# Patient Record
Sex: MALE | Race: WHITE | NOT HISPANIC OR LATINO | Employment: FULL TIME | URBAN - METROPOLITAN AREA
[De-identification: names, ages, dates, MRNs, and addresses within clinical notes are randomized per-mention and may not be internally consistent; named-entity substitution may affect disease eponyms.]

---

## 2018-11-08 ENCOUNTER — OFFICE VISIT (OUTPATIENT)
Dept: FAMILY MEDICINE | Facility: CLINIC | Age: 53
End: 2018-11-08
Payer: COMMERCIAL

## 2018-11-08 VITALS
SYSTOLIC BLOOD PRESSURE: 117 MMHG | BODY MASS INDEX: 31.55 KG/M2 | DIASTOLIC BLOOD PRESSURE: 77 MMHG | OXYGEN SATURATION: 97 % | TEMPERATURE: 98.1 F | RESPIRATION RATE: 19 BRPM | WEIGHT: 201 LBS | HEART RATE: 83 BPM | HEIGHT: 67 IN

## 2018-11-08 DIAGNOSIS — Z13.0 SCREENING, ANEMIA, DEFICIENCY, IRON: ICD-10-CM

## 2018-11-08 DIAGNOSIS — N52.9 ERECTILE DYSFUNCTION, UNSPECIFIED ERECTILE DYSFUNCTION TYPE: ICD-10-CM

## 2018-11-08 DIAGNOSIS — Z23 NEED FOR VACCINATION: ICD-10-CM

## 2018-11-08 DIAGNOSIS — Z11.59 ENCOUNTER FOR HCV SCREENING TEST FOR LOW RISK PATIENT: ICD-10-CM

## 2018-11-08 DIAGNOSIS — Z12.5 SPECIAL SCREENING FOR MALIGNANT NEOPLASM OF PROSTATE: ICD-10-CM

## 2018-11-08 DIAGNOSIS — Z00.00 ROUTINE GENERAL MEDICAL EXAMINATION AT A HEALTH CARE FACILITY: Primary | ICD-10-CM

## 2018-11-08 DIAGNOSIS — Z11.4 SCREENING FOR HUMAN IMMUNODEFICIENCY VIRUS WITHOUT PRESENCE OF RISK FACTORS: ICD-10-CM

## 2018-11-08 DIAGNOSIS — Z13.6 CARDIOVASCULAR SCREENING; LDL GOAL LESS THAN 160: ICD-10-CM

## 2018-11-08 DIAGNOSIS — Z13.1 SCREENING FOR DIABETES MELLITUS: ICD-10-CM

## 2018-11-08 LAB
ALBUMIN SERPL-MCNC: 3.9 G/DL (ref 3.4–5)
ALP SERPL-CCNC: 58 U/L (ref 40–150)
ALT SERPL W P-5'-P-CCNC: 42 U/L (ref 0–70)
ANION GAP SERPL CALCULATED.3IONS-SCNC: 8 MMOL/L (ref 3–14)
AST SERPL W P-5'-P-CCNC: 30 U/L (ref 0–45)
BASOPHILS # BLD AUTO: 0 10E9/L (ref 0–0.2)
BASOPHILS NFR BLD AUTO: 0.6 %
BILIRUB SERPL-MCNC: 0.3 MG/DL (ref 0.2–1.3)
BUN SERPL-MCNC: 10 MG/DL (ref 7–30)
CALCIUM SERPL-MCNC: 9.1 MG/DL (ref 8.5–10.1)
CHLORIDE SERPL-SCNC: 107 MMOL/L (ref 94–109)
CHOLEST SERPL-MCNC: 188 MG/DL
CO2 SERPL-SCNC: 25 MMOL/L (ref 20–32)
CREAT SERPL-MCNC: 0.77 MG/DL (ref 0.66–1.25)
DIFFERENTIAL METHOD BLD: ABNORMAL
EOSINOPHIL # BLD AUTO: 0.1 10E9/L (ref 0–0.7)
EOSINOPHIL NFR BLD AUTO: 1.5 %
ERYTHROCYTE [DISTWIDTH] IN BLOOD BY AUTOMATED COUNT: 12.4 % (ref 10–15)
GFR SERPL CREATININE-BSD FRML MDRD: >90 ML/MIN/1.7M2
GLUCOSE SERPL-MCNC: 93 MG/DL (ref 70–99)
HCT VFR BLD AUTO: 43.8 % (ref 40–53)
HCV AB SERPL QL IA: NONREACTIVE
HDLC SERPL-MCNC: 49 MG/DL
HGB BLD-MCNC: 14.6 G/DL (ref 13.3–17.7)
HIV 1+2 AB+HIV1 P24 AG SERPL QL IA: NONREACTIVE
LDLC SERPL CALC-MCNC: 109 MG/DL
LYMPHOCYTES # BLD AUTO: 1.3 10E9/L (ref 0.8–5.3)
LYMPHOCYTES NFR BLD AUTO: 38.4 %
MCH RBC QN AUTO: 31.3 PG (ref 26.5–33)
MCHC RBC AUTO-ENTMCNC: 33.3 G/DL (ref 31.5–36.5)
MCV RBC AUTO: 94 FL (ref 78–100)
MONOCYTES # BLD AUTO: 0.4 10E9/L (ref 0–1.3)
MONOCYTES NFR BLD AUTO: 10.9 %
NEUTROPHILS # BLD AUTO: 1.6 10E9/L (ref 1.6–8.3)
NEUTROPHILS NFR BLD AUTO: 48.6 %
NONHDLC SERPL-MCNC: 139 MG/DL
PLATELET # BLD AUTO: 233 10E9/L (ref 150–450)
POTASSIUM SERPL-SCNC: 4 MMOL/L (ref 3.4–5.3)
PROT SERPL-MCNC: 7.8 G/DL (ref 6.8–8.8)
PSA SERPL-ACNC: 1.44 UG/L (ref 0–4)
RBC # BLD AUTO: 4.66 10E12/L (ref 4.4–5.9)
SODIUM SERPL-SCNC: 140 MMOL/L (ref 133–144)
TRIGL SERPL-MCNC: 152 MG/DL
WBC # BLD AUTO: 3.3 10E9/L (ref 4–11)

## 2018-11-08 PROCEDURE — G0103 PSA SCREENING: HCPCS | Performed by: PHYSICIAN ASSISTANT

## 2018-11-08 PROCEDURE — 90471 IMMUNIZATION ADMIN: CPT | Performed by: PHYSICIAN ASSISTANT

## 2018-11-08 PROCEDURE — 80061 LIPID PANEL: CPT | Performed by: PHYSICIAN ASSISTANT

## 2018-11-08 PROCEDURE — 90715 TDAP VACCINE 7 YRS/> IM: CPT | Performed by: PHYSICIAN ASSISTANT

## 2018-11-08 PROCEDURE — 86803 HEPATITIS C AB TEST: CPT | Performed by: PHYSICIAN ASSISTANT

## 2018-11-08 PROCEDURE — 36415 COLL VENOUS BLD VENIPUNCTURE: CPT | Performed by: PHYSICIAN ASSISTANT

## 2018-11-08 PROCEDURE — 99386 PREV VISIT NEW AGE 40-64: CPT | Mod: 25 | Performed by: PHYSICIAN ASSISTANT

## 2018-11-08 PROCEDURE — 87389 HIV-1 AG W/HIV-1&-2 AB AG IA: CPT | Performed by: PHYSICIAN ASSISTANT

## 2018-11-08 PROCEDURE — 85025 COMPLETE CBC W/AUTO DIFF WBC: CPT | Performed by: PHYSICIAN ASSISTANT

## 2018-11-08 PROCEDURE — 80053 COMPREHEN METABOLIC PANEL: CPT | Performed by: PHYSICIAN ASSISTANT

## 2018-11-08 ASSESSMENT — ENCOUNTER SYMPTOMS
HEMATOCHEZIA: 0
NERVOUS/ANXIOUS: 0
DYSURIA: 0
HEARTBURN: 0
JOINT SWELLING: 0
MYALGIAS: 0
DIARRHEA: 0
HEMATURIA: 0
ARTHRALGIAS: 0
FEVER: 0
PARESTHESIAS: 0
COUGH: 0
NAUSEA: 0
SHORTNESS OF BREATH: 0
CONSTIPATION: 0
WEAKNESS: 0
ABDOMINAL PAIN: 0
DIZZINESS: 0
FREQUENCY: 0
SORE THROAT: 0
CHILLS: 0
EYE PAIN: 0
HEADACHES: 0
PALPITATIONS: 0

## 2018-11-08 NOTE — LETTER
November 9, 2018      Fred Orantes  [address] [facility] [address]        Dear ,    We are writing to inform you of your test results.    This letter is sent to inform you of recent test results and to provide you with personal records of health information.    Your attached labs are overall within normal limits.  Continue to work on diet and exercise.  Desired or goal levels are:  CHOLESTEROL: Desirable is less than 200.  HDL (Good Cholesterol): Desirable is greater than 40 in men and greater than 50 in women.  LDL (Bad Cholesterol): Desirable is less than 130 or less than 100 in patients with diabetes or vascular disease. For some patients with diabetes or vascular disease, the desireable LDL is less that 70.  TRIGLYCERIDES: Desirable is less than 150.    I recommend that you take Omega-3 fatty acids (available in capsules) to improve your overall levels, especially your triglycerides. You need 2000 - 4000 mg daily of EPA + DHA. This usually means 4 - 8 capsules a day, depending on the strength you buy or you can try taking red yeast rice (an herbal supplement that contains a natural statin) start with 600 mg once daily and increase to 1200 mg twice daily as tolerated.     As you may know, abnormal cholesterol is one factor that increases your risk for heart disease and stroke. You can improve your cholesterol by controlling the amount and type of fat you eat and by increasing your daily activity level.    Here are some ways to improve your nutrition (adapted from the American Academy of Family Practice handout):  Eat less fat (especially butter, Crisco and other saturated fats)  Buy lean cuts of meat; reduce your portions of red meat or substitute poultry or fish  Use skim milk and low-fat dairy products  Eat no more than 4 egg yolks per week  Avoid fried or fast foods that are high in fat  Eat more fruits and vegetables    Also consider starting or increasing your aerobic activity;  this is the best way to improve HDL (good) cholesterol. If aerobic activity would be new to you, please talk with me first about what activities are safe for you.    Thank you for allowing me to participate in your care. If you have any further questions or problems, please contact me at [phone].    Resulted Orders   Comprehensive metabolic panel   Result Value Ref Range    Sodium 140 133 - 144 mmol/L    Potassium 4.0 3.4 - 5.3 mmol/L    Chloride 107 94 - 109 mmol/L    Carbon Dioxide 25 20 - 32 mmol/L    Anion Gap 8 3 - 14 mmol/L    Glucose 93 70 - 99 mg/dL      Comment:      Non Fasting    Urea Nitrogen 10 7 - 30 mg/dL    Creatinine 0.77 0.66 - 1.25 mg/dL    GFR Estimate >90 >60 mL/min/1.7m2      Comment:      Non  GFR Calc    GFR Estimate If Black >90 >60 mL/min/1.7m2      Comment:       GFR Calc    Calcium 9.1 8.5 - 10.1 mg/dL    Bilirubin Total 0.3 0.2 - 1.3 mg/dL    Albumin 3.9 3.4 - 5.0 g/dL    Protein Total 7.8 6.8 - 8.8 g/dL    Alkaline Phosphatase 58 40 - 150 U/L    ALT 42 0 - 70 U/L    AST 30 0 - 45 U/L   CBC with platelets differential   Result Value Ref Range    WBC 3.3 (L) 4.0 - 11.0 10e9/L    RBC Count 4.66 4.4 - 5.9 10e12/L    Hemoglobin 14.6 13.3 - 17.7 g/dL    Hematocrit 43.8 40.0 - 53.0 %    MCV 94 78 - 100 fl    MCH 31.3 26.5 - 33.0 pg    MCHC 33.3 31.5 - 36.5 g/dL    RDW 12.4 10.0 - 15.0 %    Platelet Count 233 150 - 450 10e9/L    % Neutrophils 48.6 %    % Lymphocytes 38.4 %    % Monocytes 10.9 %    % Eosinophils 1.5 %    % Basophils 0.6 %    Absolute Neutrophil 1.6 1.6 - 8.3 10e9/L    Absolute Lymphocytes 1.3 0.8 - 5.3 10e9/L    Absolute Monocytes 0.4 0.0 - 1.3 10e9/L    Absolute Eosinophils 0.1 0.0 - 0.7 10e9/L    Absolute Basophils 0.0 0.0 - 0.2 10e9/L    Diff Method Automated Method    Lipid panel reflex to direct LDL Fasting   Result Value Ref Range    Cholesterol 188 <200 mg/dL    Triglycerides 152 (H) <150 mg/dL      Comment:      Borderline high:   150-199 mg/dl  High:             200-499 mg/dl  Very high:       >499 mg/dl  Non Fasting      HDL Cholesterol 49 >39 mg/dL    LDL Cholesterol Calculated 109 (H) <100 mg/dL      Comment:      Above desirable:  100-129 mg/dl  Borderline High:  130-159 mg/dL  High:             160-189 mg/dL  Very high:       >189 mg/dl      Non HDL Cholesterol 139 (H) <130 mg/dL      Comment:      Above Desirable:  130-159 mg/dl  Borderline high:  160-189 mg/dl  High:             190-219 mg/dl  Very high:       >219 mg/dl     HIV Antigen Antibody Combo   Result Value Ref Range    HIV Antigen Antibody Combo Nonreactive NR^Nonreactive          Comment:      HIV-1 p24 Ag & HIV-1/HIV-2 Ab Not Detected   **Hepatitis C Screen Reflex to RNA FUTURE anytime   Result Value Ref Range    Hepatitis C Antibody Nonreactive NR^Nonreactive      Comment:      Assay performance characteristics have not been established for newborns,   infants, and children     Prostate spec antigen screen   Result Value Ref Range    PSA 1.44 0 - 4 ug/L      Comment:      Assay Method:  Chemiluminescence using Siemens Vista analyzer       If you have any questions or concerns, please call the clinic at the number listed above.       Sincerely,        Anya Weaver PA-C/nr

## 2018-11-08 NOTE — PROGRESS NOTES
SUBJECTIVE:   CC: Fred Orantes is an 53 year old male who presents for preventative health visit.     Physical   Annual:     Getting at least 3 servings of Calcium per day:  NO    Bi-annual eye exam:  NO    Dental care twice a year:  Yes    Sleep apnea or symptoms of sleep apnea:  None    Diet:  Regular (no restrictions)    Frequency of exercise:  6-7 days/week    Duration of exercise:  30-45 minutes    Taking medications regularly:  Not Applicable    Additional concerns today:  No    OTHER:  erectile dysfunction issue - for the last 6-9 months; wondering about options.    Today's PHQ-2 Score:   PHQ-2 ( 1999 [employer]) 11/8/2018   Q1: Little interest or pleasure in doing things 0   Q2: Feeling down, depressed or hopeless 0   PHQ-2 Score 0   Q1: Little interest or pleasure in doing things Not at all   Q2: Feeling down, depressed or hopeless Not at all   PHQ-2 Score 0       Abuse: Current or Past (Physical, Sexual or Emotional) - No  Do you feel safe in your environment - Yes    Social History   Substance Use Topics     Smoking status: Never Smoker     Smokeless tobacco: Never Used     Alcohol use Yes      Comment: 15-20 glasses of wine weekly     Alcohol Use 11/8/2018   If you drink alcohol do you typically have greater than 3 drinks per day OR greater than 7 drinks per week? Yes   No flowsheet data found.    Last PSA: No results found for: PSA    Reviewed orders with patient. Reviewed health maintenance and updated orders accordingly - Yes  Labs reviewed in EPIC  BP Readings from Last 3 Encounters:   11/08/18 117/77   03/18/14 100/70   10/27/08 122/86    Wt Readings from Last 3 Encounters:   11/08/18 201 lb (91.2 kg)   03/18/14 202 lb (91.6 kg)   10/27/08 187 lb 8 oz (85 kg)                  Patient Active Problem List   Diagnosis     CARDIOVASCULAR SCREENING; LDL GOAL LESS THAN 160     Past Surgical History:   Procedure Laterality Date     HC REMOVAL OF TONSILS,<11 Y/O  1973     LASIK  2007     VASECTOMY  1993      "  Social History   Substance Use Topics     Smoking status: Never Smoker     Smokeless tobacco: Never Used     Alcohol use Yes      Comment: 15-20 glasses of wine weekly     Family History   Problem Relation Age of Onset     Family History Negative Mother      Family History Negative Father      Family History Negative Sister      Family History Negative Brother      Family History Negative Daughter      Family History Negative Son          No current outpatient prescriptions on file.     No Known Allergies  No lab results found.     Reviewed and updated as needed this visit by clinical staff  Tobacco  Allergies  Meds  Problems  Med Hx  Surg Hx  Fam Hx  Soc Hx          Reviewed and updated as needed this visit by Provider  Allergies  Meds  Problems            Review of Systems   Constitutional: Negative for chills and fever.   HENT: Negative for congestion, ear pain, hearing loss and sore throat.    Eyes: Negative for pain and visual disturbance.   Respiratory: Negative for cough and shortness of breath.    Cardiovascular: Negative for chest pain, palpitations and peripheral edema.   Gastrointestinal: Negative for abdominal pain, constipation, diarrhea, heartburn, hematochezia and nausea.   Genitourinary: Positive for impotence. Negative for discharge, dysuria, frequency, genital sores, hematuria and urgency.   Musculoskeletal: Negative for arthralgias, joint swelling and myalgias.   Skin: Negative for rash.   Neurological: Negative for dizziness, weakness, headaches and paresthesias.   Psychiatric/Behavioral: Negative for mood changes. The patient is not nervous/anxious.        OBJECTIVE:   /77 (BP Location: Left arm, Patient Position: Sitting, Cuff Size: Adult Large)  Pulse 83  Temp 98.1  F (36.7  C) (Oral)  Resp 19  Ht 5' 7\" (1.702 m)  Wt 201 lb (91.2 kg)  SpO2 97%  BMI 31.48 kg/m2    Physical Exam  GENERAL: healthy, alert and no distress  EYES: Eyes grossly normal to inspection, PERRL and " "conjunctivae and sclerae normal  HENT: ear canals and TM's normal, nose and mouth without ulcers or lesions  NECK: no adenopathy, no asymmetry, masses, or scars and thyroid normal to palpation  RESP: lungs clear to auscultation - no rales, rhonchi or wheezes  CV: regular rate and rhythm, normal S1 S2, no S3 or S4, no murmur, click or rub, no peripheral edema and peripheral pulses strong  ABDOMEN: soft, nontender, no hepatosplenomegaly, no masses and bowel sounds normal  MS: no gross musculoskeletal defects noted, no edema  SKIN: no suspicious lesions or rashes  NEURO: Normal strength and tone, mentation intact and speech normal  PSYCH: mentation appears normal, affect normal/bright    Diagnostic Test Results:  none     ASSESSMENT/PLAN:       ICD-10-CM    1. Routine general medical examination at a health care facility Z00.00    2. Erectile dysfunction, unspecified erectile dysfunction type N52.9 UROLOGY ADULT REFERRAL   3. CARDIOVASCULAR SCREENING; LDL GOAL LESS THAN 160 Z13.6 Lipid panel reflex to direct LDL Fasting   4. Screening for diabetes mellitus Z13.1 Comprehensive metabolic panel   5. Screening, anemia, deficiency, iron Z13.0 CBC with platelets differential   6. Screening for human immunodeficiency virus without presence of risk factors Z11.4 HIV Antigen Antibody Combo   7. Encounter for HCV screening test for low risk patient Z11.59 **Hepatitis C Screen Reflex to RNA FUTURE anytime   8. Special screening for malignant neoplasm of prostate Z12.5 Prostate spec antigen screen   9. Need for vaccination Z23 TDAP, IM (10 - 64 YRS) - Adacel     ADMIN 1st VACCINE       COUNSELING:   Reviewed preventive health counseling, as reflected in patient instructions       Regular exercise       Healthy diet/nutrition       Vision screening    BP Readings from Last 1 Encounters:   11/08/18 117/77     Estimated body mass index is 31.48 kg/(m^2) as calculated from the following:    Height as of this encounter: 5' 7\" (1.702 " zhao).    Weight as of this encounter: 201 lb (91.2 kg).      Weight management plan: Discussed healthy diet and exercise guidelines and patient will follow up in 12 months in clinic to re-evaluate.     reports that he has never smoked. He has never used smokeless tobacco.      Counseling Resources:  ATP IV Guidelines  Pooled Cohorts Equation Calculator  FRAX Risk Assessment  ICSI Preventive Guidelines  Dietary Guidelines for Americans, 2010  USDA's MyPlate  ASA Prophylaxis  Lung CA Screening    Anya Weaver PA-C  [facility]    Answers for HPI/ROS submitted by the patient on 11/8/2018   PHQ-2 Score: 0

## 2018-11-08 NOTE — NURSING NOTE
Screening Questionnaire for Adult Immunization     Are you sick today?   No    Do you have allergies to medications, food or any vaccine?   No    Have you ever had a serious reaction after receiving a vaccination?   No    Do you have a long-term health problem with heart disease, lung disease,  asthma, kidney disease, diabetes, anemia, metabolic or blood disease?   No    Do you have cancer, leukemia, AIDS, or any immune system problem?   No    Do you take cortisone, prednisone, other steroids, or anticancer drugs, or  have you had any x-ray (radiation) treatments?   No    Have you had a seizure, brain, or other nervous system problem?   No    During the past year, have you received a transfusion of blood or blood       products, or been given a medicine called immune (gamma) globulin?   No    For women: Are you pregnant or is there a chance you could become         pregnant during the next month?   No    Have you received any vaccinations in the past 4 weeks?   No     Immunization questionnaire answers were all negative.      MNVFC doesn't apply on this patient      Per orders of Plosser, injection of tdap given by Mike Melendez. Patient instructed to remain in clinic for 20 minutes afterwards, and to report any adverse reaction to me immediately.    Prior to injection verified patient identity using patient's name and date of birth.         Screening performed by Mike Melendez, CMA

## 2018-11-08 NOTE — MR AVS SNAPSHOT
After Visit Summary   11/8/2018    Fred Orantes    MRN: [id number]           Patient Information     Date Of Birth          1965        Visit Information        Provider Department      11/8/2018 9:00 AM Anya Weaver PA-C [facility]        Today's Diagnoses     Routine general medical examination at a health care facility    -  1    Erectile dysfunction, unspecified erectile dysfunction type        CARDIOVASCULAR SCREENING; LDL GOAL LESS THAN 160        Screening for diabetes mellitus        Screening, anemia, deficiency, iron        Screening for human immunodeficiency virus without presence of risk factors        Encounter for HCV screening test for low risk patient        Special screening for malignant neoplasm of prostate        Need for vaccination          Care Instructions      Preventive Health Recommendations  Male Ages 50 - 64    Yearly exam:             See your health care provider every year in order to  o   Review health changes.   o   Discuss preventive care.    o   Review your medicines if your doctor has prescribed any.     Have a cholesterol test every 5 years, or more frequently if you are at risk for high cholesterol/heart disease.     Have a diabetes test (fasting glucose) every three years. If you are at risk for diabetes, you should have this test more often.     Have a colonoscopy at age 50, or have a yearly FIT test (stool test). These exams will check for colon cancer.      Talk with your health care provider about whether or not a prostate cancer screening test (PSA) is right for you.    You should be tested each year for STDs (sexually transmitted diseases), if you re at risk.     Shots: Get a flu shot each year. Get a tetanus shot every 10 years.     Nutrition:    Eat at least 5 servings of fruits and vegetables daily.     Eat whole-grain bread, whole-wheat pasta and brown rice instead of white grains and rice.     Get adequate Calcium  and Vitamin D.     Lifestyle    Exercise for at least 150 minutes a week (30 minutes a day, 5 days a week). This will help you control your weight and prevent disease.     Limit alcohol to one drink per day.     No smoking.     Wear sunscreen to prevent skin cancer.     See your dentist every six months for an exam and cleaning.     See your eye doctor every 1 to 2 years.      Call Central Scheduling [phone] to schedule your colonoscopy.            Follow-ups after your visit        Additional Services     UROLOGY ADULT REFERRAL       Your provider has referred you to: [facility]: [facility] [place] [phone]   [email or web address]  [facility]: [place] [facility]  [place] [phone]   [email or web address]    Please be aware that coverage of these services is subject to the terms and limitations of your health insurance plan.  Call member services at your health plan with any benefit or coverage questions.      Please bring the following with you to your appointment:    (1) Any X-Rays, CTs or MRIs which have been performed.  Contact the facility where they were done to arrange for  prior to your scheduled appointment.    (2) List of current medications  (3) This referral request   (4) Any documents/labs given to you for this referral                  Your next 10 appointments already scheduled     Nov 08, 2018  9:00 AM CST   PHYSICAL with Anya Weaver PA-C   [facility] ([facility])    [address] [place] [id number] [phone]              Who to contact     If you have questions or need follow up information about today's clinic visit or your schedule please contact [facility] directly at [phone].  Normal or non-critical lab and imaging results will be communicated to you by MyChart, letter or phone within 4 business days after the clinic has received the results. If you do not hear from  "us within 7 days, please contact the clinic through [employer] or phone. If you have a critical or abnormal lab result, we will notify you by phone as soon as possible.  Submit refill requests through [employer] or call your pharmacy and they will forward the refill request to us. Please allow 3 business days for your refill to be completed.          Additional Information About Your Visit        Care EveryWhere ID     This is your Care EveryWhere ID. This could be used by other organizations to access your [place] medical records  [id number]        Your Vitals Were     Pulse Temperature Respirations Height Pulse Oximetry BMI (Body Mass Index)    83 98.1  F (36.7  C) (Oral) 19 5' 7\" (1.702 m) 97% 31.48 kg/m2       Blood Pressure from Last 3 Encounters:   11/08/18 117/77   03/18/14 100/70   10/27/08 122/86    Weight from Last 3 Encounters:   11/08/18 201 lb (91.2 kg)   03/18/14 202 lb (91.6 kg)   10/27/08 187 lb 8 oz (85 kg)              We Performed the Following     **Hepatitis C Screen Reflex to RNA FUTURE anytime     CBC with platelets differential     Comprehensive metabolic panel     HIV Antigen Antibody Combo     Lipid panel reflex to direct LDL Fasting     Prostate spec antigen screen     TDAP, IM (10 - 64 YRS) - Adacel     UROLOGY ADULT REFERRAL        Primary Care Provider Office Phone # Fax #    Anya Weaver PA-C [phone]       [address]  [facility] [address]        Equal Access to Services     CALEB KELLY : Hadii aad ku hadasho Soomaali, waaxda luqadaha, qaybta kaalmada adeegyada, warner colon . So [facility] [phone].    ATENCIÓN: Si habla español, tiene a lloyd disposición servicios gratuitos de asistencia lingüística. Jayleen al [phone].    We comply with applicable federal civil rights laws and [place] laws. We do not discriminate on the basis of race, color, national origin, age, disability, sex, sexual orientation, or gender " identity.            Thank you!     Thank you for choosing [facility]  for your care. Our goal is always to provide you with excellent care. Hearing back from our patients is one way we can continue to improve our services. Please take a few minutes to complete the written survey that you may receive in the mail after your visit with us. Thank you!             Your Updated Medication List - Protect others around you: Learn how to safely use, store and throw away your medicines at www.disposemymeds.org.      Notice  As of 11/8/2018  8:43 AM    You have not been prescribed any medications.

## 2018-11-08 NOTE — PATIENT INSTRUCTIONS
Preventive Health Recommendations  Male Ages 50 - 64    Yearly exam:             See your health care provider every year in order to  o   Review health changes.   o   Discuss preventive care.    o   Review your medicines if your doctor has prescribed any.     Have a cholesterol test every 5 years, or more frequently if you are at risk for high cholesterol/heart disease.     Have a diabetes test (fasting glucose) every three years. If you are at risk for diabetes, you should have this test more often.     Have a colonoscopy at age 50, or have a yearly FIT test (stool test). These exams will check for colon cancer.      Talk with your health care provider about whether or not a prostate cancer screening test (PSA) is right for you.    You should be tested each year for STDs (sexually transmitted diseases), if you re at risk.     Shots: Get a flu shot each year. Get a tetanus shot every 10 years.     Nutrition:    Eat at least 5 servings of fruits and vegetables daily.     Eat whole-grain bread, whole-wheat pasta and brown rice instead of white grains and rice.     Get adequate Calcium and Vitamin D.     Lifestyle    Exercise for at least 150 minutes a week (30 minutes a day, 5 days a week). This will help you control your weight and prevent disease.     Limit alcohol to one drink per day.     No smoking.     Wear sunscreen to prevent skin cancer.     See your dentist every six months for an exam and cleaning.     See your eye doctor every 1 to 2 years.      Call Central Scheduling [phone] to schedule your colonoscopy.

## 2018-11-09 NOTE — PROGRESS NOTES
Please send patient letter with the following:  This letter is sent to inform you of recent test results and to provide you with personal records of health information.    Your attached labs are overall within normal limits.  Continue to work on diet and exercise.  Desired or goal levels are:  CHOLESTEROL: Desirable is less than 200.  HDL (Good Cholesterol): Desirable is greater than 40 in men and greater than 50 in women.  LDL (Bad Cholesterol): Desirable is less than 130 or less than 100 in patients with diabetes or vascular disease. For some patients with diabetes or vascular disease, the desireable LDL is less that 70.  TRIGLYCERIDES: Desirable is less than 150.    I recommend that you take Omega-3 fatty acids (available in capsules) to improve your overall levels, especially your triglycerides. You need 2000 - 4000 mg daily of EPA + DHA. This usually means 4 - 8 capsules a day, depending on the strength you buy or you can try taking red yeast rice (an herbal supplement that contains a natural statin) start with 600 mg once daily and increase to 1200 mg twice daily as tolerated.     As you may know, abnormal cholesterol is one factor that increases your risk for heart disease and stroke. You can improve your cholesterol by controlling the amount and type of fat you eat and by increasing your daily activity level.    Here are some ways to improve your nutrition (adapted from the American Academy of Family Practice handout):  Eat less fat (especially butter, Crisco and other saturated fats)  Buy lean cuts of meat; reduce your portions of red meat or substitute poultry or fish  Use skim milk and low-fat dairy products  Eat no more than 4 egg yolks per week  Avoid fried or fast foods that are high in fat  Eat more fruits and vegetables    Also consider starting or increasing your aerobic activity; this is the best way to improve HDL (good) cholesterol. If aerobic activity would be new to you, please talk with me  "first about what activities are safe for you.    Thank you for allowing me to participate in your care. If you have any further questions or problems, please contact me at [phone].    Anya Batista \"Luigi\" WALLACE Weaver"

## 2019-01-14 ENCOUNTER — TELEPHONE (OUTPATIENT)
Dept: GASTROENTEROLOGY | Facility: CLINIC | Age: 54
End: 2019-01-14

## 2019-01-14 ENCOUNTER — TELEPHONE (OUTPATIENT)
Dept: FAMILY MEDICINE | Facility: CLINIC | Age: 54
End: 2019-01-14

## 2019-01-14 DIAGNOSIS — Z12.11 SPECIAL SCREENING FOR MALIGNANT NEOPLASMS, COLON: Primary | ICD-10-CM

## 2019-01-14 NOTE — TELEPHONE ENCOUNTER
Patient is scheduled for a colonoscopy on 2/6/19, at 1:55 PM, arriving at 12:55 PM -  with Dr. Solorzano   [facility]   Pharmacy Location:[address] [place] [address]   Please contact patient to discuss prep and additional instructions. Per staff message.       Best phone number to use: [phone]   Best time to contact the patient:anytime     Additional comments: may leave a detailed message   Patient was scheduled for colonoscopy with Dr. Solorzano on Tuesday February 6th. Writer order Golytely prescription. Ancillary staff will send out instructions. Patient verbalized understanding of plan of care.

## 2019-02-04 ENCOUNTER — TELEPHONE (OUTPATIENT)
Dept: GASTROENTEROLOGY | Facility: CLINIC | Age: 54
End: 2019-02-04

## 2019-02-04 NOTE — TELEPHONE ENCOUNTER
Patient scheduled for:  [] EGD  [x] Colonoscopy  [] EUS  [] Flex Sig   [] Other:     Indication for procedure. [x] Screening   []     Procedure Provider:  Zeferino Naqvi      Referring Provider.     Arrival time verified: Wed; 2/6/19; 1255    Facility location verified:   [][facility] - [address] [facility], 1st Floor, [address]  [x][address] [facility], 5th floor       Prep Type:   [x]Golytely eRx: (has been sent);  [] MoviPrep: , [] MiraLax: , [] Other:   []NPO /p MN, No solid food /p 2200 the night before    Anticoagulants or blood thinners: [x]None [] ASA 81mg [] Warfarin  [] Warfarin + Lovenox bridge [] Plavix [] Effient [] Eliquis  [] Xarelto  [] Brilinta [] NSAIDS  [] Other    LAST anticoagulant dose: Date/Time:   INR:     Electronic implanted devices: [x] No  [] IPG  []  ICD  []  LVAD  []     H&P / Pre op physical completed: [x] N/A, [] Complete, Date , [] Scheduled, Date , [] No,     Additional Information:       Instructions given: [] Rec d & Read   [] Reviewed   [] Resent via [employer]     [x] Resent via eMail (see below) Pt requests resend endoscopy appointment information communication via unencrypted e-mail. This includes Split-dose Golytely instructions, Unit J map, Conscious Sedation policy, procedure date/time/location/provider.  Pt acknowledges that they have agreed to accept the risks and receive unencrypted communications for this incidence.       Pre procedure teaching completed: [x] Yes - Reviewed, [] No, Transportation from procedure: [x] Yes Wife, [] Pending , [] No     Madhuri Ross RN  [facility] Endoscopy

## 2019-02-04 NOTE — TELEPHONE ENCOUNTER
Returned patient's  with query regarding Colonoscopy check in time.     Procedure: Colonoscopy  Date: 2/6/2019  Check-in Time: 1255   Provider: Dr. Solorzano  Location: [facility] [address] [address]  Questions: [phone]     telephone call-back number provided should patient have additional questions in addition to the above.    Madhuri Ross RN  [facility] Endoscopy

## 2019-02-06 ENCOUNTER — HOSPITAL ENCOUNTER (OUTPATIENT)
Facility: AMBULATORY SURGERY CENTER | Age: 54
End: 2019-02-06
Attending: INTERNAL MEDICINE
Payer: COMMERCIAL

## 2019-02-06 VITALS
RESPIRATION RATE: 14 BRPM | HEIGHT: 68 IN | OXYGEN SATURATION: 97 % | HEART RATE: 79 BPM | SYSTOLIC BLOOD PRESSURE: 120 MMHG | DIASTOLIC BLOOD PRESSURE: 88 MMHG | BODY MASS INDEX: 30.46 KG/M2 | TEMPERATURE: 98 F | WEIGHT: 201 LBS

## 2019-02-06 LAB — COLONOSCOPY: NORMAL

## 2019-02-06 RX ORDER — ONDANSETRON 2 MG/ML
4 INJECTION INTRAMUSCULAR; INTRAVENOUS
Status: DISCONTINUED | OUTPATIENT
Start: 2019-02-06 | End: 2019-02-07 | Stop reason: HOSPADM

## 2019-02-06 RX ORDER — LIDOCAINE 40 MG/G
CREAM TOPICAL
Status: DISCONTINUED | OUTPATIENT
Start: 2019-02-06 | End: 2019-02-07 | Stop reason: HOSPADM

## 2019-02-06 RX ORDER — FENTANYL CITRATE 50 UG/ML
INJECTION, SOLUTION INTRAMUSCULAR; INTRAVENOUS PRN
Status: DISCONTINUED | OUTPATIENT
Start: 2019-02-06 | End: 2019-02-06 | Stop reason: HOSPADM

## 2019-02-06 RX ORDER — SIMETHICONE
LIQUID (ML) MISCELLANEOUS PRN
Status: DISCONTINUED | OUTPATIENT
Start: 2019-02-06 | End: 2019-02-06 | Stop reason: HOSPADM

## 2019-02-06 ASSESSMENT — MIFFLIN-ST. JEOR: SCORE: 1731.23

## 2019-02-08 LAB — COPATH REPORT: NORMAL

## 2020-10-12 ENCOUNTER — OFFICE VISIT (OUTPATIENT)
Dept: FAMILY MEDICINE | Facility: CLINIC | Age: 55
End: 2020-10-12
Payer: COMMERCIAL

## 2020-10-12 ENCOUNTER — NURSE TRIAGE (OUTPATIENT)
Dept: NURSING | Facility: CLINIC | Age: 55
End: 2020-10-12

## 2020-10-12 VITALS
SYSTOLIC BLOOD PRESSURE: 136 MMHG | DIASTOLIC BLOOD PRESSURE: 80 MMHG | WEIGHT: 200 LBS | TEMPERATURE: 98.1 F | OXYGEN SATURATION: 97 % | HEIGHT: 68 IN | HEART RATE: 81 BPM | BODY MASS INDEX: 30.31 KG/M2 | RESPIRATION RATE: 16 BRPM

## 2020-10-12 DIAGNOSIS — I49.8 PERIODIC HEART FLUTTER: Primary | ICD-10-CM

## 2020-10-12 LAB
BASOPHILS # BLD AUTO: 0 10E9/L (ref 0–0.2)
BASOPHILS NFR BLD AUTO: 0.2 %
DIFFERENTIAL METHOD BLD: NORMAL
EOSINOPHIL # BLD AUTO: 0.1 10E9/L (ref 0–0.7)
EOSINOPHIL NFR BLD AUTO: 1.4 %
ERYTHROCYTE [DISTWIDTH] IN BLOOD BY AUTOMATED COUNT: 12.2 % (ref 10–15)
HBA1C MFR BLD: 4.8 % (ref 0–5.6)
HCT VFR BLD AUTO: 43.2 % (ref 40–53)
HGB BLD-MCNC: 14.6 G/DL (ref 13.3–17.7)
LYMPHOCYTES # BLD AUTO: 1.9 10E9/L (ref 0.8–5.3)
LYMPHOCYTES NFR BLD AUTO: 36.9 %
MCH RBC QN AUTO: 32.2 PG (ref 26.5–33)
MCHC RBC AUTO-ENTMCNC: 33.8 G/DL (ref 31.5–36.5)
MCV RBC AUTO: 95 FL (ref 78–100)
MONOCYTES # BLD AUTO: 0.6 10E9/L (ref 0–1.3)
MONOCYTES NFR BLD AUTO: 12.2 %
NEUTROPHILS # BLD AUTO: 2.5 10E9/L (ref 1.6–8.3)
NEUTROPHILS NFR BLD AUTO: 49.3 %
PLATELET # BLD AUTO: 240 10E9/L (ref 150–450)
RBC # BLD AUTO: 4.53 10E12/L (ref 4.4–5.9)
WBC # BLD AUTO: 5 10E9/L (ref 4–11)

## 2020-10-12 PROCEDURE — 80053 COMPREHEN METABOLIC PANEL: CPT | Performed by: FAMILY MEDICINE

## 2020-10-12 PROCEDURE — 83036 HEMOGLOBIN GLYCOSYLATED A1C: CPT | Performed by: FAMILY MEDICINE

## 2020-10-12 PROCEDURE — 99214 OFFICE O/P EST MOD 30 MIN: CPT | Performed by: FAMILY MEDICINE

## 2020-10-12 PROCEDURE — 36415 COLL VENOUS BLD VENIPUNCTURE: CPT | Performed by: FAMILY MEDICINE

## 2020-10-12 PROCEDURE — 93000 ELECTROCARDIOGRAM COMPLETE: CPT | Performed by: FAMILY MEDICINE

## 2020-10-12 PROCEDURE — 84439 ASSAY OF FREE THYROXINE: CPT | Performed by: FAMILY MEDICINE

## 2020-10-12 ASSESSMENT — MIFFLIN-ST. JEOR: SCORE: 1716.69

## 2020-10-12 NOTE — NURSING NOTE
"Chief Complaint   Patient presents with     Heart Problem     initial /80 (BP Location: Right arm, Cuff Size: Adult Large)   Pulse 81   Temp 98.1  F (36.7  C) (Tympanic)   Resp 16   Ht 1.727 m (5' 8\")   Wt 90.7 kg (200 lb)   SpO2 97%   BMI 30.41 kg/m   Estimated body mass index is 30.41 kg/m  as calculated from the following:    Height as of this encounter: 1.727 m (5' 8\").    Weight as of this encounter: 90.7 kg (200 lb).  BP completed using cuff size: large.  R arm      Health Maintenance that is potentially due pending provider review:  NONE    n/a    Olegario Diaz ma  "

## 2020-10-12 NOTE — PATIENT INSTRUCTIONS
PLEASE CALL ONE OF THE FOLLOWING NUMBERS TO SCHEDULE YOUR CARDIOLOGY PROCEDURE/VISIT:    [place] OR [facility]:   STRESS ECHO, ECHO, STRESS TEST,CARDIAC CT OR MRI   [phone]     [facility]TER OR EVENT MONITORS:   [phone]    [facility] CARDIOLOGY SCHEDULING:   [phone]

## 2020-10-12 NOTE — TELEPHONE ENCOUNTER
"Feels a missed beat once in a while and last few days it has occurred more often.    Apple watch confirms it.    No passing, no fainting.  HR 78    Needs to be seen in next 3 days.    Amada Dubose RN  [facility] Nurse Advisor      Additional Information    Negative: Passed out (i.e., fainted, collapsed and was not responding)    Negative: Shock suspected (e.g., cold/pale/clammy skin, too weak to stand, low BP, rapid pulse)    Negative: Difficult to awaken or acting confused (e.g., disoriented, slurred speech)    Negative: Visible sweat on face or sweat dripping down face    Negative: Unable to walk, or can only walk with assistance (e.g., requires support)    Negative: Received SHOCK from implantable cardiac defibrillator and has persisting symptoms (i.e., palpitations, lightheadedness)    Negative: Sounds like a life-threatening emergency to the triager    Negative: Chest pain    Negative: Difficulty breathing    Negative: Dizziness, lightheadedness, or weakness    Negative: Heart beating very rapidly (e.g., > 140 / minute) and present now (EXCEPTION: during exercise)    Negative: Heart beating very slowly (e.g., < 50 / minute) (EXCEPTION: athlete)    Negative: New or worsened shortness of breath with activity (dyspnea on exertion)    Negative: Patient sounds very sick or weak to the triager    Patient wants to be seen    Negative: Wearing a \"holter monitor\" or \"cardiac event monitor\"    Negative: Received SHOCK from implantable cardiac defibrillator (and now feels well)    Negative: Heart beating very rapidly (e.g., > 140 / minute) and not present now (EXCEPTION: during exercise)    Negative: Skipped or extra beat(s) and occurs 4 or more times per minute    Negative: History of heart disease (i.e., heart attack, bypass surgery, angina, angioplasty)    Negative: Age > 60 years    Negative: Taking water pill (i.e., diuretic) or heart medication (e.g., digoxin)    Protocols used: HEART RATE AND HEARTBEAT " [id number]

## 2020-10-12 NOTE — PROGRESS NOTES
"Subjective     Fred Orantes is a 55 year old male who presents to clinic today for the following health issues:    HPI         Pleasant 55-year-old male with no significant past medical history who presents to the clinic for evaluation of heart flutters.  The patient reports that he had flutters throughout his life over the past 1 month they have increased in frequency.  The patient was wearing an apple watch at that time and was able to record his heart flutters.  Currently he denies any chest pain or shortness of breath.  Denies any dyspnea on exertion.  Denies any swelling of his lower extremities.  He denies feeling dizziness or any other associated symptoms at the time of the flutter.  Reports that he is able to feel it at least once every 5 minutes.  Medical history is negative for any cardiac disease.      Review of Systems   Constitutional, HEENT, cardiovascular, pulmonary, gi and gu systems are negative, except as otherwise noted.      Objective    /80 (BP Location: Right arm, Cuff Size: Adult Large)   Pulse 81   Temp 98.1  F (36.7  C) (Tympanic)   Resp 16   Ht 1.727 m (5' 8\")   Wt 90.7 kg (200 lb)   SpO2 97%   BMI 30.41 kg/m    Body mass index is 30.41 kg/m .  Physical Exam   GENERAL: healthy, alert and no distress  RESP: lungs clear to auscultation - no rales, rhonchi or wheezes  CV: regular rate and rhythm, normal S1 S2, no S3 or S4, no murmur, click or rub, no peripheral edema and peripheral pulses strong    Results for orders placed or performed in visit on 10/12/20   CBC with platelets and differential     Status: None   Result Value Ref Range    WBC 5.0 4.0 - 11.0 10e9/L    RBC Count 4.53 4.4 - 5.9 10e12/L    Hemoglobin 14.6 13.3 - 17.7 g/dL    Hematocrit 43.2 40.0 - 53.0 %    MCV 95 78 - 100 fl    MCH 32.2 26.5 - 33.0 pg    MCHC 33.8 31.5 - 36.5 g/dL    RDW 12.2 10.0 - 15.0 %    Platelet Count 240 150 - 450 10e9/L    % Neutrophils 49.3 %    % Lymphocytes 36.9 %    % Monocytes 12.2 % "    % Eosinophils 1.4 %    % Basophils 0.2 %    Absolute Neutrophil 2.5 1.6 - 8.3 10e9/L    Absolute Lymphocytes 1.9 0.8 - 5.3 10e9/L    Absolute Monocytes 0.6 0.0 - 1.3 10e9/L    Absolute Eosinophils 0.1 0.0 - 0.7 10e9/L    Absolute Basophils 0.0 0.0 - 0.2 10e9/L    Diff Method Automated Method    **A1C FUTURE anytime     Status: None   Result Value Ref Range    Hemoglobin A1C 4.8 0 - 5.6 %           Assessment & Plan     Periodic heart flutter  Assessment: Palpitations with arrhythmia Differential Dx includes atrial fibrillation and V. tach supraventricular tachycardia or premature ventricular contractions.  probability of a cardiac or coronary artery disease related arrhythmia is low because of: atypical symptoms and lack of other lifestyle risk factors.   I lean towards premature ventricular contractions as a diagnosis because of normal electrocardiogram and exam today    We will do electrolytes as a follow-up   Plan:  - EKG 12-lead complete w/read - Clinics; Future  - CBC with platelets and differential  - **Comprehensive metabolic panel FUTURE anytime  - TSH with free T4 reflex  - **A1C FUTURE anytime  - Lipid panel reflex to direct LDL Fasting; Future  - Zio Patch Holter Adult Pediatric Greater than 48 hrs; Future  -    Return in about 2 weeks (around 10/26/2020).  The patient will undergo a Holter monitor and possibly an EKG prior to our next follow-up visit.  He was also advised to start over-the-counter 81 mg aspirin.  He was not fasting today and he will return to clinic for fasting lipid profile.    Marisol Escobedo MD  [facility]

## 2020-10-13 DIAGNOSIS — I49.8 PERIODIC HEART FLUTTER: ICD-10-CM

## 2020-10-13 DIAGNOSIS — Z11.59 SCREENING FOR VIRAL DISEASE: Primary | ICD-10-CM

## 2020-10-13 LAB
ALBUMIN SERPL-MCNC: 3.9 G/DL (ref 3.4–5)
ALP SERPL-CCNC: 57 U/L (ref 40–150)
ALT SERPL W P-5'-P-CCNC: 33 U/L (ref 0–70)
ANION GAP SERPL CALCULATED.3IONS-SCNC: 6 MMOL/L (ref 3–14)
AST SERPL W P-5'-P-CCNC: 27 U/L (ref 0–45)
BILIRUB SERPL-MCNC: 0.4 MG/DL (ref 0.2–1.3)
BUN SERPL-MCNC: 15 MG/DL (ref 7–30)
CALCIUM SERPL-MCNC: 9.2 MG/DL (ref 8.5–10.1)
CHLORIDE SERPL-SCNC: 109 MMOL/L (ref 94–109)
CHOLEST SERPL-MCNC: 199 MG/DL
CO2 SERPL-SCNC: 25 MMOL/L (ref 20–32)
CREAT SERPL-MCNC: 0.85 MG/DL (ref 0.66–1.25)
GFR SERPL CREATININE-BSD FRML MDRD: >90 ML/MIN/{1.73_M2}
GLUCOSE SERPL-MCNC: 95 MG/DL (ref 70–99)
HDLC SERPL-MCNC: 58 MG/DL
LDLC SERPL CALC-MCNC: 121 MG/DL
NONHDLC SERPL-MCNC: 141 MG/DL
POTASSIUM SERPL-SCNC: 3.9 MMOL/L (ref 3.4–5.3)
PROT SERPL-MCNC: 7.9 G/DL (ref 6.8–8.8)
SODIUM SERPL-SCNC: 140 MMOL/L (ref 133–144)
T4 FREE SERPL-MCNC: 0.87 NG/DL (ref 0.76–1.46)
TRIGL SERPL-MCNC: 100 MG/DL
TSH SERPL DL<=0.005 MIU/L-ACNC: 5.32 MU/L (ref 0.4–4)

## 2020-10-13 PROCEDURE — 80061 LIPID PANEL: CPT | Performed by: FAMILY MEDICINE

## 2020-10-14 NOTE — RESULT ENCOUNTER NOTE
Dear Fred,   Your thyroid stimulating hormone is slightly elevated but your systemic thyroid hormone is normal. This means that you have subclinical hypothyroidism. There is no need to be on medications to treat this. We will continue to check once every 6 months to ensure that your T4 stays in the normal range.     Best regards    Marisol Escobedo MD

## 2020-10-14 NOTE — RESULT ENCOUNTER NOTE
Dear Fred,   Here are your recent results. Your cholesterol test is slightly worse than last year. LDL(bad) cholesterol level is elevated which can increase your heart disease risk.  A diet high in fat and simple carbohydrates, genetics and being overweight can contribute to this. ADVISE: exercising 150 minutes of aerobic exercise per week (30 minutes for 5 days per week or 50 minutes for 3 days per week are options) and eating a low saturated fat/low carbohydrate diet are helpful to improve this. In 6 months, you should recheck your fasting cholesterol panel by scheduling a lab-only appointment.      Marisol Escobedo MD

## 2020-10-15 ENCOUNTER — HOSPITAL ENCOUNTER (OUTPATIENT)
Dept: CARDIOLOGY | Facility: CLINIC | Age: 55
Discharge: HOME | End: 2020-10-15
Attending: FAMILY MEDICINE | Admitting: FAMILY MEDICINE
Payer: COMMERCIAL

## 2020-10-15 DIAGNOSIS — I49.8 PERIODIC HEART FLUTTER: ICD-10-CM

## 2020-10-15 PROCEDURE — 0298T LEADLESS EKG MONITOR 3 TO 14 DAYS: CPT | Performed by: INTERNAL MEDICINE

## 2020-10-15 PROCEDURE — 0296T LEADLESS EKG MONITOR 3 TO 14 DAYS: CPT

## 2020-10-30 ENCOUNTER — MYC MEDICAL ADVICE (OUTPATIENT)
Dept: FAMILY MEDICINE | Facility: CLINIC | Age: 55
End: 2020-10-30

## 2020-10-30 NOTE — RESULT ENCOUNTER NOTE
Dear Fred,  I received the results of your Zio patch Holter monitor. Overall it was reassuring. There were a few premature atrial contractions and premature ventricular contractions. It is a benign finding and it does not require any treatment. If you are still experiencing symptoms, I would recommend a cardiac echo.     Best regards,   Marisol Escobedo MD

## 2021-01-10 ENCOUNTER — HEALTH MAINTENANCE LETTER (OUTPATIENT)
Age: 56
End: 2021-01-10

## 2021-10-23 ENCOUNTER — HEALTH MAINTENANCE LETTER (OUTPATIENT)
Age: 56
End: 2021-10-23

## 2022-02-12 ENCOUNTER — HEALTH MAINTENANCE LETTER (OUTPATIENT)
Age: 57
End: 2022-02-12

## 2022-10-10 ENCOUNTER — HEALTH MAINTENANCE LETTER (OUTPATIENT)
Age: 57
End: 2022-10-10

## 2023-02-18 ENCOUNTER — HEALTH MAINTENANCE LETTER (OUTPATIENT)
Age: 58
End: 2023-02-18

## 2023-04-11 ASSESSMENT — ENCOUNTER SYMPTOMS
HEMATOCHEZIA: 0
FREQUENCY: 0
COUGH: 0
SORE THROAT: 0
NERVOUS/ANXIOUS: 0
ARTHRALGIAS: 0
NAUSEA: 0
ABDOMINAL PAIN: 0
HEMATURIA: 0
JOINT SWELLING: 0
DIZZINESS: 0
MYALGIAS: 0
CONSTIPATION: 0
PARESTHESIAS: 0
EYE PAIN: 0
HEADACHES: 0
CHILLS: 0
PALPITATIONS: 0
FEVER: 0
DYSURIA: 0
WEAKNESS: 0
DIARRHEA: 0
SHORTNESS OF BREATH: 0
HEARTBURN: 0

## 2023-04-18 ENCOUNTER — OFFICE VISIT (OUTPATIENT)
Dept: FAMILY MEDICINE | Facility: CLINIC | Age: 58
End: 2023-04-18
Payer: COMMERCIAL

## 2023-04-18 VITALS
TEMPERATURE: 97.3 F | DIASTOLIC BLOOD PRESSURE: 84 MMHG | SYSTOLIC BLOOD PRESSURE: 126 MMHG | HEART RATE: 72 BPM | RESPIRATION RATE: 18 BRPM | OXYGEN SATURATION: 96 % | WEIGHT: 193 LBS | HEIGHT: 67 IN | BODY MASS INDEX: 30.29 KG/M2

## 2023-04-18 DIAGNOSIS — Z13.220 SCREENING CHOLESTEROL LEVEL: ICD-10-CM

## 2023-04-18 DIAGNOSIS — Z00.00 ROUTINE ADULT HEALTH MAINTENANCE: Primary | ICD-10-CM

## 2023-04-18 DIAGNOSIS — Z13.228 SCREENING FOR ENDOCRINE, METABOLIC AND IMMUNITY DISORDER: ICD-10-CM

## 2023-04-18 DIAGNOSIS — Z13.0 SCREENING FOR DEFICIENCY ANEMIA: ICD-10-CM

## 2023-04-18 DIAGNOSIS — Z23 ENCOUNTER FOR IMMUNIZATION: ICD-10-CM

## 2023-04-18 DIAGNOSIS — Z13.29 SCREENING FOR ENDOCRINE, METABOLIC AND IMMUNITY DISORDER: ICD-10-CM

## 2023-04-18 DIAGNOSIS — H90.3 BILATERAL SENSORINEURAL HEARING LOSS: ICD-10-CM

## 2023-04-18 DIAGNOSIS — Z13.0 SCREENING FOR ENDOCRINE, METABOLIC AND IMMUNITY DISORDER: ICD-10-CM

## 2023-04-18 DIAGNOSIS — H93.13 TINNITUS, BILATERAL: ICD-10-CM

## 2023-04-18 LAB
ANION GAP SERPL CALCULATED.3IONS-SCNC: 14 MMOL/L (ref 7–15)
BUN SERPL-MCNC: 16.2 MG/DL (ref 6–20)
CALCIUM SERPL-MCNC: 10.2 MG/DL (ref 8.6–10)
CHLORIDE SERPL-SCNC: 102 MMOL/L (ref 98–107)
CHOLEST SERPL-MCNC: 185 MG/DL
CREAT SERPL-MCNC: 0.89 MG/DL (ref 0.67–1.17)
DEPRECATED HCO3 PLAS-SCNC: 23 MMOL/L (ref 22–29)
ERYTHROCYTE [DISTWIDTH] IN BLOOD BY AUTOMATED COUNT: 12 % (ref 10–15)
GFR SERPL CREATININE-BSD FRML MDRD: >90 ML/MIN/1.73M2
GLUCOSE SERPL-MCNC: 93 MG/DL (ref 70–99)
HCT VFR BLD AUTO: 44.7 % (ref 40–53)
HDLC SERPL-MCNC: 57 MG/DL
HGB BLD-MCNC: 14.9 G/DL (ref 13.3–17.7)
LDLC SERPL CALC-MCNC: 102 MG/DL
MCH RBC QN AUTO: 31.8 PG (ref 26.5–33)
MCHC RBC AUTO-ENTMCNC: 33.3 G/DL (ref 31.5–36.5)
MCV RBC AUTO: 96 FL (ref 78–100)
NONHDLC SERPL-MCNC: 128 MG/DL
PLATELET # BLD AUTO: 260 10E3/UL (ref 150–450)
POTASSIUM SERPL-SCNC: 4.6 MMOL/L (ref 3.4–5.3)
RBC # BLD AUTO: 4.68 10E6/UL (ref 4.4–5.9)
SODIUM SERPL-SCNC: 139 MMOL/L (ref 136–145)
TRIGL SERPL-MCNC: 131 MG/DL
WBC # BLD AUTO: 5 10E3/UL (ref 4–11)

## 2023-04-18 PROCEDURE — 90750 HZV VACC RECOMBINANT IM: CPT | Performed by: PHYSICIAN ASSISTANT

## 2023-04-18 PROCEDURE — 99396 PREV VISIT EST AGE 40-64: CPT | Mod: 25 | Performed by: PHYSICIAN ASSISTANT

## 2023-04-18 PROCEDURE — 36415 COLL VENOUS BLD VENIPUNCTURE: CPT | Performed by: PHYSICIAN ASSISTANT

## 2023-04-18 PROCEDURE — 80048 BASIC METABOLIC PNL TOTAL CA: CPT | Performed by: PHYSICIAN ASSISTANT

## 2023-04-18 PROCEDURE — 80061 LIPID PANEL: CPT | Performed by: PHYSICIAN ASSISTANT

## 2023-04-18 PROCEDURE — 85027 COMPLETE CBC AUTOMATED: CPT | Performed by: PHYSICIAN ASSISTANT

## 2023-04-18 PROCEDURE — 90471 IMMUNIZATION ADMIN: CPT | Performed by: PHYSICIAN ASSISTANT

## 2023-04-18 ASSESSMENT — ENCOUNTER SYMPTOMS
CONSTIPATION: 0
HEMATURIA: 0
FEVER: 0
ABDOMINAL PAIN: 0
HEARTBURN: 0
CHILLS: 0
JOINT SWELLING: 0
NERVOUS/ANXIOUS: 0
DYSURIA: 0
PARESTHESIAS: 0
HEADACHES: 0
EYE PAIN: 0
DIZZINESS: 0
HEMATOCHEZIA: 0
DIARRHEA: 0
PALPITATIONS: 0
NAUSEA: 0
WEAKNESS: 0
SORE THROAT: 0
COUGH: 0
ARTHRALGIAS: 0
SHORTNESS OF BREATH: 0
FREQUENCY: 0
MYALGIAS: 0

## 2023-04-18 ASSESSMENT — PAIN SCALES - GENERAL: PAINLEVEL: NO PAIN (0)

## 2023-04-18 NOTE — PROGRESS NOTES
SUBJECTIVE:   CC: Fred is an 57 year old who presents for preventative health visit.       4/18/2023     3:47 PM   Additional Questions   Roomed by Zenobia ANDERSON   Patient has been advised of split billing requirements and indicates understanding: Yes  Healthy Habits:     Getting at least 3 servings of Calcium per day:  NO    Bi-annual eye exam:  NO    Dental care twice a year:  Yes    Sleep apnea or symptoms of sleep apnea:  None    Diet:  Regular (no restrictions)    Frequency of exercise:  6-7 days/week    Duration of exercise:  45-60 minutes    Taking medications regularly:  Yes    Medication side effects:  None    PHQ-2 Total Score: 0    Additional concerns today:  Yes    Has been working on weight loss  Cutting back on drinking as part of this - has noticed improvement since cutting back so doesn't think this is the issue    Notes ongoing hearing struggles, tinnitus    Today's PHQ-2 Score:       4/11/2023     9:36 AM   PHQ-2 ( 1999 [employer])   Q1: Little interest or pleasure in doing things 0   Q2: Feeling down, depressed or hopeless 0   PHQ-2 Score 0   Q1: Little interest or pleasure in doing things Not at all    Not at all   Q2: Feeling down, depressed or hopeless Not at all    Not at all   PHQ-2 Score 0    0     Have you ever done Advance Care Planning? (For example, a Health Directive, POLST, or a discussion with a medical provider or your loved ones about your wishes): No, advance care planning information given to patient to review.  Advanced care planning was discussed at today's visit.    Social History     Tobacco Use     Smoking status: Never     Smokeless tobacco: Never   Vaping Use     Vaping status: Never Used   Substance Use Topics     Alcohol use: Yes     Comment: 15-20 glasses of wine weekly             4/18/2023     3:49 PM   Alcohol Use   Prescreen: >3 drinks/day or >7 drinks/week? Yes   AUDIT SCORE  7         4/18/2023     3:49 PM   AUDIT - Alcohol Use Disorders Identification Test - Reproduced  from the World Health Organization Audit 2001 (Second Edition)   1.  How often do you have a drink containing alcohol? 4 or more times a week   2.  How many drinks containing alcohol do you have on a typical day when you are drinking? 3 or 4   3.  How often do you have five or more drinks on one occasion? Less than monthly   4.  How often during the last year have you found that you were not able to stop drinking once you had started? Never   5.  How often during the last year have you failed to do what was normally expected of you because of drinking? Never   6.  How often during the last year have you needed a first drink in the morning to get yourself going after a heavy drinking session? Never   7.  How often during the last year have you had a feeling of guilt or remorse after drinking? Never   8.  How often during the last year have you been unable to remember what happened the night before because of your drinking? Less than monthly   9.  Have you or someone else been injured because of your drinking? No   10. Has a relative, friend, doctor or other health care worker been concerned about your drinking or suggested you cut down? No   TOTAL SCORE 7     Last PSA:   PSA   Date Value Ref Range Status   11/08/2018 1.44 0 - 4 ug/L Final     Comment:     Assay Method:  Chemiluminescence using Siemens Vista analyzer       Reviewed orders with patient. Reviewed health maintenance and updated orders accordingly - Yes    Reviewed and updated as needed this visit by clinical staff   Tobacco  Allergies  Meds  Problems  Med Hx  Surg Hx  Fam Hx          Reviewed and updated as needed this visit by Provider   Tobacco    Problems  Med Hx  Surg Hx  Fam Hx           Review of Systems   Constitutional: Negative for chills and fever.   HENT: Negative for congestion, ear pain, hearing loss and sore throat.    Eyes: Negative for pain and visual disturbance.   Respiratory: Negative for cough and shortness of breath.   "  Cardiovascular: Negative for chest pain, palpitations and peripheral edema.   Gastrointestinal: Negative for abdominal pain, constipation, diarrhea, heartburn, hematochezia and nausea.   Genitourinary: Negative for dysuria, frequency, genital sores, hematuria, impotence, penile discharge and urgency.   Musculoskeletal: Negative for arthralgias, joint swelling and myalgias.   Skin: Negative for rash.   Neurological: Negative for dizziness, weakness, headaches and paresthesias.   Psychiatric/Behavioral: Negative for mood changes. The patient is not nervous/anxious.        OBJECTIVE:   /84 (BP Location: Right arm, Patient Position: Sitting, Cuff Size: Adult Regular)   Pulse 72   Temp 97.3  F (36.3  C) (Temporal)   Resp 18   Ht 1.699 m (5' 6.9\")   Wt 87.5 kg (193 lb)   SpO2 96%   BMI 30.32 kg/m      Physical Exam  GENERAL: healthy, alert and no distress  EYES: Eyes grossly normal to inspection, PERRL and conjunctivae and sclerae normal  HENT: ear canals and TM's normal, nose and mouth without ulcers or lesions  NECK: no adenopathy, no asymmetry, masses, or scars and thyroid normal to palpation  RESP: lungs clear to auscultation - no rales, rhonchi or wheezes  CV: regular rate and rhythm, normal S1 S2, no S3 or S4, no murmur, click or rub, no peripheral edema and peripheral pulses strong  ABDOMEN: soft, nontender, no hepatosplenomegaly, no masses and bowel sounds normal  MS: no gross musculoskeletal defects noted, no edema  SKIN: no suspicious lesions or rashes  NEURO: Normal strength and tone, mentation intact and speech normal  PSYCH: mentation appears normal, affect normal/bright      ASSESSMENT/PLAN:   Fred was seen today for physical.    Diagnoses and all orders for this visit:    Routine adult health maintenance  -     REVIEW OF HEALTH MAINTENANCE PROTOCOL ORDERS    Bilateral sensorineural hearing loss  -     Adult Audiology  Referral; Future    Tinnitus, bilateral  -     Adult Audiology "  Referral; Future    Screening for deficiency anemia  -     CBC with platelets; Future  -     CBC with platelets    Screening for endocrine, metabolic and immunity disorder  -     Basic metabolic panel; Future  -     Basic metabolic panel    Screening cholesterol level  -     Lipid panel reflex to direct LDL Non-fasting; Future  -     Lipid panel reflex to direct LDL Non-fasting    Encounter for immunization  -     ZOSTER VACCINE RECOMBINANT ADJUVANTED (SHINGRIX)      COUNSELING:   Reviewed preventive health counseling, as reflected in patient instructions    He reports that he has never smoked. He has never used smokeless tobacco.          Maggie Mittal PA-C  [facility]

## 2023-04-18 NOTE — NURSING NOTE
Prior to immunization administration, verified patients identity using patient s name and date of birth. Please see Immunization Activity for additional information.     Screening Questionnaire for Adult Immunization    Are you sick today?   No   Do you have allergies to medications, food, a vaccine component or latex?   No   Have you ever had a serious reaction after receiving a vaccination?   No   Do you have a long-term health problem with heart, lung, kidney, or metabolic disease (e.g., diabetes), asthma, a blood disorder, no spleen, complement component deficiency, a cochlear implant, or a spinal fluid leak?  Are you on long-term aspirin therapy?   No   Do you have cancer, leukemia, HIV/AIDS, or any other immune system problem?   No   Do you have a parent, brother, or sister with an immune system problem?   No   In the past 3 months, have you taken medications that affect  your immune system, such as prednisone, other steroids, or anticancer drugs; drugs for the treatment of rheumatoid arthritis, Crohn s disease, or psoriasis; or have you had radiation treatments?   No   Have you had a seizure, or a brain or other nervous system problem?   No   During the past year, have you received a transfusion of blood or blood    products, or been given immune (gamma) globulin or antiviral drug?   No   For women: Are you pregnant or is there a chance you could become       pregnant during the next month?   No   Have you received any vaccinations in the past 4 weeks?   No     Immunization questionnaire answers were all negative.      Injection of shingrix given by Amy Campbell MA. Patient instructed to remain in clinic for 15 minutes afterwards, and to report any adverse reactions.     Screening performed by Amy Campbell MA on 4/18/2023 at 4:36 PM.

## 2023-04-20 NOTE — TELEPHONE ENCOUNTER
FUTURE VISIT INFORMATION      FUTURE VISIT INFORMATION:    Date: 6/7/23    Time: 2:10PM    Location: [facility]  REFERRAL INFORMATION:    Referring provider:  Maggie Mittal PA-C    Referring providers clinic:  [facility]    Reason for visit/diagnosis  Per Pt, dx tinnitus, referral from PCP, recs in epic    RECORDS REQUESTED FROM:       Clinic name Comments Records Status Imaging Status   [facility]  4/18/23- NOTE WITH Maggie Mittal PA-C Epic

## 2023-06-07 ENCOUNTER — PRE VISIT (OUTPATIENT)
Dept: OTOLARYNGOLOGY | Facility: CLINIC | Age: 58
End: 2023-06-07

## 2023-06-07 ENCOUNTER — OFFICE VISIT (OUTPATIENT)
Dept: OTOLARYNGOLOGY | Facility: CLINIC | Age: 58
End: 2023-06-07
Attending: PHYSICIAN ASSISTANT
Payer: COMMERCIAL

## 2023-06-07 ENCOUNTER — OFFICE VISIT (OUTPATIENT)
Dept: AUDIOLOGY | Facility: CLINIC | Age: 58
End: 2023-06-07
Attending: PHYSICIAN ASSISTANT
Payer: COMMERCIAL

## 2023-06-07 VITALS
DIASTOLIC BLOOD PRESSURE: 74 MMHG | HEIGHT: 67 IN | WEIGHT: 186 LBS | TEMPERATURE: 98.1 F | SYSTOLIC BLOOD PRESSURE: 111 MMHG | OXYGEN SATURATION: 97 % | BODY MASS INDEX: 29.19 KG/M2

## 2023-06-07 DIAGNOSIS — H93.13 TINNITUS, BILATERAL: Primary | ICD-10-CM

## 2023-06-07 DIAGNOSIS — H90.3 BILATERAL SENSORINEURAL HEARING LOSS: ICD-10-CM

## 2023-06-07 PROCEDURE — 92557 COMPREHENSIVE HEARING TEST: CPT | Performed by: AUDIOLOGIST

## 2023-06-07 PROCEDURE — 92565 STENGER TEST PURE TONE: CPT | Performed by: AUDIOLOGIST

## 2023-06-07 PROCEDURE — 92550 TYMPANOMETRY & REFLEX THRESH: CPT | Performed by: AUDIOLOGIST

## 2023-06-07 PROCEDURE — 99203 OFFICE O/P NEW LOW 30 MIN: CPT | Performed by: REGISTERED NURSE

## 2023-06-07 ASSESSMENT — PAIN SCALES - GENERAL: PAINLEVEL: NO PAIN (0)

## 2023-06-07 NOTE — NURSING NOTE
"Chief Complaint   Patient presents with     Consult     Tinnitus      Blood pressure 111/74, temperature 98.1  F (36.7  C), height 1.702 m (5' 7\"), weight 84.4 kg (186 lb), SpO2 97 %.    Cash Oliver LPN  v  "

## 2023-06-07 NOTE — LETTER
6/7/2023       RE: Fred Orantes  [address] [address]     Dear Colleague,    Thank you for referring your patient, Fred Orantes, to the [facility] [place] at [facility]. Please see a copy of my visit note below.      Otolaryngology Clinic  June 7, 2023    Chief Complaint:   Bilateral tinnitus       History of Present Illness:   Fred Orantes is a 57 year old male who presents today for evaluation of bilateral tinnitus. States that tinnitus has been present for over 30 years. Reports a high pitched tinnitus that varies in intensity. Overall, this is not bothersome to patient. Reports that left ear is his better hearing ear. History of significant noise exposure from concerts without hearing protection.     Patient denies any otalgia, otorrhea, dizziness, hearing loss, facial numbness/weakness, history of frequent ear infections, or ear surgeries.     Past Medical History:  Past Medical History:   Diagnosis Date    NO ACTIVE PROBLEMS        Past Surgical History:  Past Surgical History:   Procedure Laterality Date    HC REMOVAL OF TONSILS,<13 Y/O  1973    LASIK  2007    VASECTOMY  1993       Medications:  No current outpatient medications on file.       Allergies:  No Known Allergies     Social History:  Social History     Tobacco Use    Smoking status: Never    Smokeless tobacco: Never   Vaping Use    Vaping status: Never Used   Substance Use Topics    Alcohol use: Yes     Comment: 15-20 glasses of wine weekly    Drug use: No       ROS: 10 point ROS neg other than the symptoms noted above in the HPI.    Physical Exam:    There were no vitals taken for this visit.     Constitutional:  The patient was unaccompanied, well-groomed, and in no acute distress.     Neurologic: Alert and oriented x 3.  CN's III-XII within normal limits.  Voice normal.   Psychiatric: The patient's affect was calm,  cooperative, and appropriate.    Communication:  Normal; communicates verbally, normal voice quality.   Respiratory: Breathing comfortably without stridor or exertion of accessory muscles.    Head/Face:  Normocephalic and atraumatic.  No lesions or scars.   Salivary glands -  Normal size, no tenderness, swelling, or palpable masses    Ears: Pinnae and tragus non-tender.  EAC's and TM's were clear.       Audiogram: 6/7/2023 - data independently reviewed  Right ear: Normal sloping to moderate SNHL   Left ear: Mild rising to normal sloping to moderate SNHL   WR right: 100% left: 96% at 65 dB  Acoustic Reflexes:   Tympanograms: type A bilaterally    Assessment and Plan:  1. Tinnitus, bilateral  Patient with longstanding history of bilateral tinnitus.  Reviewed audiogram today with patient which shows bilateral high frequency hearing loss. Explained to patient that this hearing loss could be contributing to tinnitus symptoms. Reviewed other factors that can contribute to tinnitus including stress, anxiety, lack of sleep, and neck/muscle tension.     Explained to patient that there is no specific medication or procedure that can eliminate tinnitus, however, there are evidence-based management strategies that can be used to improve symptoms. Patient is not a hearing aid candidate at this time. Discussed management strategies with patient including tinnitus masking and cognitive behavioral approaches.    Patient reports that his tinnitus symptoms are well managed at this time. Recommend continuing to monitor.    Follow up in 1 year with audiogram.     Karrie Cedillo DNP, APRN, CNP  Otolaryngology  Head & Neck Surgery  [phone]    30 minutes spent by me on the date of the encounter doing chart review, history and exam, documentation and further activities per the note        Again, thank you for allowing me to participate in the care of your patient.      Sincerely,    Mary Cedillo, NP

## 2023-06-07 NOTE — PROGRESS NOTES
AUDIOLOGY REPORT    SUMMARY: Audiology visit completed. See audiogram for results.      RECOMMENDATIONS: Follow-up with ENT.    Isa Sharma. CCC-A  Licensed Audiologist   [place] #[id number]

## 2023-06-07 NOTE — PROGRESS NOTES
Otolaryngology Clinic  June 7, 2023    Chief Complaint:   Bilateral tinnitus       History of Present Illness:   Fred Orantes is a 57 year old male who presents today for evaluation of bilateral tinnitus. States that tinnitus has been present for over 30 years. Reports a high pitched tinnitus that varies in intensity. Overall, this is not bothersome to patient. Reports that left ear is his better hearing ear. History of significant noise exposure from concerts without hearing protection.     Patient denies any otalgia, otorrhea, dizziness, hearing loss, facial numbness/weakness, history of frequent ear infections, or ear surgeries.     Past Medical History:  Past Medical History:   Diagnosis Date     NO ACTIVE PROBLEMS        Past Surgical History:  Past Surgical History:   Procedure Laterality Date     HC REMOVAL OF TONSILS,<11 Y/O  1973     LASIK  2007     VASECTOMY  1993       Medications:  No current outpatient medications on file.       Allergies:  No Known Allergies     Social History:  Social History     Tobacco Use     Smoking status: Never     Smokeless tobacco: Never   Vaping Use     Vaping status: Never Used   Substance Use Topics     Alcohol use: Yes     Comment: 15-20 glasses of wine weekly     Drug use: No       ROS: 10 point ROS neg other than the symptoms noted above in the HPI.    Physical Exam:    There were no vitals taken for this visit.     Constitutional:  The patient was unaccompanied, well-groomed, and in no acute distress.     Neurologic: Alert and oriented x 3.  CN's III-XII within normal limits.  Voice normal.   Psychiatric: The patient's affect was calm, cooperative, and appropriate.    Communication:  Normal; communicates verbally, normal voice quality.   Respiratory: Breathing comfortably without stridor or exertion of accessory muscles.    Head/Face:  Normocephalic and atraumatic.  No lesions or scars.   Salivary glands -  Normal size, no tenderness, swelling, or palpable  masses    Ears: Pinnae and tragus non-tender.  EAC's and TM's were clear.       Audiogram: 6/7/2023 - data independently reviewed  Right ear: Normal sloping to moderate SNHL   Left ear: Mild rising to normal sloping to moderate SNHL   WR right: 100% left: 96% at 65 dB  Acoustic Reflexes:   Tympanograms: type A bilaterally    Assessment and Plan:  1. Tinnitus, bilateral  Patient with longstanding history of bilateral tinnitus.  Reviewed audiogram today with patient which shows bilateral high frequency hearing loss. Explained to patient that this hearing loss could be contributing to tinnitus symptoms. Reviewed other factors that can contribute to tinnitus including stress, anxiety, lack of sleep, and neck/muscle tension.     Explained to patient that there is no specific medication or procedure that can eliminate tinnitus, however, there are evidence-based management strategies that can be used to improve symptoms. Patient is not a hearing aid candidate at this time. Discussed management strategies with patient including tinnitus masking and cognitive behavioral approaches.    Patient reports that his tinnitus symptoms are well managed at this time. Recommend continuing to monitor.    Follow up in 1 year with audiogram.     Karrie Cedillo DNP, APRN, CNP  Otolaryngology  Head & Neck Surgery  [phone]    30 minutes spent by me on the date of the encounter doing chart review, history and exam, documentation and further activities per the note

## 2023-11-14 ENCOUNTER — PATIENT OUTREACH (OUTPATIENT)
Dept: GASTROENTEROLOGY | Facility: CLINIC | Age: 58
End: 2023-11-14
Payer: COMMERCIAL

## 2023-11-14 DIAGNOSIS — Z12.11 SPECIAL SCREENING FOR MALIGNANT NEOPLASMS, COLON: Primary | ICD-10-CM

## 2023-11-14 NOTE — PROGRESS NOTES
"[facility] Screening Colonoscopy Referral Review    Patient meets the inclusion criteria for screening colonoscopy standing order.    Ordering/Referring Provider:  Maggie Mittal PA-C    BMI: Estimated body mass index is 29.13 kg/m  as calculated from the following:    Height as of 6/7/23: 1.702 m (5' 7\").    Weight as of 6/7/23: 84.4 kg (186 lb).     Sedation:  Does patient have any of the following conditions affecting sedation?  No medical conditions affecting sedation.    Previous Scopes:  Any previous recommendations or follow up needs based on previous scope?  na / No recommendations.    Medical Concerns to Postpone Order:  Does patient have any of the following medical concerns that should postpone/delay colonoscopy referral?  No medical conditions affecting colonoscopy referral.    Final Referral Details:  Based on patient's medical history patient is appropriate for referral order with moderate sedation. If patient's BMI > 50 do not schedule in [facility].  "

## 2024-05-25 ENCOUNTER — HEALTH MAINTENANCE LETTER (OUTPATIENT)
Age: 59
End: 2024-05-25

## 2025-06-14 ENCOUNTER — HEALTH MAINTENANCE LETTER (OUTPATIENT)
Age: 60
End: 2025-06-14

## (undated) DEVICE — TAPE DURAPORE 3" SILK 1538-3

## (undated) DEVICE — WIPE PREMOIST CLEANSING WASHCLOTHS 7988

## (undated) DEVICE — ENDO CONNECTOR ENDOGATOR AUX WATER JET FOR OLYMPUS SCOPE

## (undated) DEVICE — SUCTION MANIFOLD NEPTUNE 2 SYS 4 PORT 0702-020-000

## (undated) DEVICE — SOL WATER IRRIG 1000ML BOTTLE 2F7114

## (undated) DEVICE — SPECIMEN CONTAINER 3OZ W/FORMALIN 59901

## (undated) DEVICE — ENDO CAP AND TUBING STERILE FOR ENDOGATOR  100130

## (undated) DEVICE — ENDO FORCEP ENDOJAW BIOPSY 2.8MMX230CM FB-220U

## (undated) RX ORDER — FENTANYL CITRATE 50 UG/ML
INJECTION, SOLUTION INTRAMUSCULAR; INTRAVENOUS
Status: DISPENSED
Start: 2019-02-06